# Patient Record
Sex: MALE | Race: WHITE | URBAN - METROPOLITAN AREA
[De-identification: names, ages, dates, MRNs, and addresses within clinical notes are randomized per-mention and may not be internally consistent; named-entity substitution may affect disease eponyms.]

---

## 2022-08-04 ENCOUNTER — OFFICE VISIT (OUTPATIENT)
Dept: URBAN - METROPOLITAN AREA CLINIC 71 | Facility: CLINIC | Age: 57
End: 2022-08-04

## 2022-08-04 DIAGNOSIS — H52.4 PRESBYOPIA: ICD-10-CM

## 2022-08-04 DIAGNOSIS — H04.123 DRY EYE SYNDROME OF BILATERAL LACRIMAL GLANDS: ICD-10-CM

## 2022-08-04 DIAGNOSIS — H52.13 MYOPIA, BILATERAL: ICD-10-CM

## 2022-08-04 DIAGNOSIS — H25.13 AGE-RELATED NUCLEAR CATARACT, BILATERAL: Primary | ICD-10-CM

## 2022-08-04 PROCEDURE — 92310 CONTACT LENS FITTING OU: CPT

## 2022-08-04 PROCEDURE — 92002 INTRM OPH EXAM NEW PATIENT: CPT

## 2022-08-04 ASSESSMENT — INTRAOCULAR PRESSURE
OD: 18
OS: 14

## 2022-08-04 ASSESSMENT — VISUAL ACUITY
OS: 20/20
OD: 20/20

## 2022-08-04 NOTE — IMPRESSION/PLAN
Impression: Presbyopia: H52.4. Plan: Dispensed new glasses Rx today and discussed changes and possible adaptation period. Patient to call if any issues with new glasses occur. Dispensed trial contact lenses today due to change in Rx. Patient to call if he likes fit or if any changes need to be made. Patient voices understanding.